# Patient Record
Sex: MALE | Race: WHITE
[De-identification: names, ages, dates, MRNs, and addresses within clinical notes are randomized per-mention and may not be internally consistent; named-entity substitution may affect disease eponyms.]

---

## 2017-07-19 ENCOUNTER — HOSPITAL ENCOUNTER (EMERGENCY)
Dept: HOSPITAL 25 - UCEAST | Age: 67
Discharge: HOME | End: 2017-07-19
Payer: MEDICARE

## 2017-07-19 VITALS — SYSTOLIC BLOOD PRESSURE: 133 MMHG | DIASTOLIC BLOOD PRESSURE: 94 MMHG

## 2017-07-19 DIAGNOSIS — R21: Primary | ICD-10-CM

## 2017-07-19 PROCEDURE — G0463 HOSPITAL OUTPT CLINIC VISIT: HCPCS

## 2017-07-19 PROCEDURE — 99211 OFF/OP EST MAY X REQ PHY/QHP: CPT

## 2017-07-19 NOTE — UC
Skin Complaint HPI





- HPI Summary


HPI Summary: 





This is a 65 yo gentleman with OA of his L hip who presents with c/o rash.  He 

reports that he used a kinesiology tape on his L hip yesterday and was awoken 

in the middle of the night with a painful rash.  He is unsure if the lesion 

that developed is over the exact area that the tape was in place.  He went 

cycling yesterday and today with the tape in place.  He denies a h/o similar 

reaction.  No associated fever or generalized illness.





- History of Current Complaint


Chief Complaint: UCSkin


Stated Complaint: RASH





- Allergy/Home Medications


Allergies/Adverse Reactions: 


 Allergies











Allergy/AdvReac Type Severity Reaction Status Date / Time


 


Apple Allergy  Unknown Verified 07/08/15 10:48





   Reaction  





   Details  














Review of Systems


Constitutional: Negative


Skin: Rash


Eyes: Negative


ENT: Negative


Respiratory: Negative


Cardiovascular: Negative


Gastrointestinal: Negative


Genitourinary: Negative


Motor: Negative


Neurovascular: Negative


Musculoskeletal: Negative


Neurological: Negative


Psychological: Negative


All Other Systems Reviewed And Are Negative: Yes





PMH/Surg Hx/FS Hx/Imm Hx


Previously Healthy: No - L hip OA





- Surgical History


Surgical History: Yes


Surgery Procedure, Year, and Place: RT HIP REPLACEMENT





- Family History


Known Family History: Positive: None





- Social History


Alcohol Use: Weekly


Alcohol Amount: 2 GLASSES WEEKLY


Substance Use Type: None


Smoking Status (MU): Never Smoked Tobacco





Physical Exam


Triage Information Reviewed: Yes


Vital Signs: 


 Initial Vital Signs











Temp  98 F   07/19/17 15:27


 


Pulse  81   07/19/17 15:27


 


Resp  20   07/19/17 15:27


 


BP  133/94   07/19/17 15:27


 


Pulse Ox  99   07/19/17 15:27











Vital Signs Reviewed: Yes


Respiratory: Positive: Chest non-tender, Lungs clear


Cardiovascular: Positive: RRR, No Murmur


Skin: Positive: Other - focal lesion just inferior to the GT, no surrounding 

erythema, superficial layer of skin appears to be sheared off





Course/Dx





- Course


Course Of Treatment: This is a 65 yo gentleman presenting with c/o painful 

rash.  It appears to be a shear injury, perhaps from the tape.  Recommended to 

keep the area covered to prevent infection and inspect daily to look for new 

lesions or signs of infection





- Diagnoses


Provider Diagnoses: 1. Rash





Discharge





- Discharge Plan


Condition: Stable


Disposition: HOME


Patient Education Materials:  Acute Rash (ED)


Referrals: 


Amor Chávez MD [Primary Care Provider] - If Needed


Additional Instructions: 


Activity: As tolerated





Instructions:


1. Keep the area covered to prevent infection, but inspect daily


2. Follow up if there is increased pain, redness or swelling or if new lesions 

develop

## 2017-10-13 NOTE — HP
PREOPERATIVE HISTORY AND PHYSICAL:

 

DATE OF ADMISSION:  10/23/17

 

PROVIDER:  Dr. Erasto Rust * (DICTATED BY MANE BUCKLEY)

 

CHIEF COMPLAINT:  Left hip pain.

 

HISTORY OF PRESENT ILLNESS:  Mr. Deleon is a 66-year-old male, who has had 
ongoing pain in the left hip.  He underwent a right total hip arthroplasty in 
June of 2011 and is doing very well with that.  He is interested in surgical 
intervention for the pain in the left hip at this point.  He takes pain 
medication at night to help him sleep.  He also has increased pain when going 
from sitting to standing position.  He has failed conservative treatment at 
this point.

 

PAST MEDICAL HISTORY:  Significant BPH, bradycardia, obstructive sleep apnea.

 

PAST SURGICAL HISTORY:  Right total hip replacement in 2011, right knee 
arthroscopy and partial meniscectomy in 2016, left carpal tunnel release in 
2015.  He reports no complications with anesthesia with these procedures.

 

CURRENT MEDICATIONS:

1.  Mobic 15 mg 1 p.o. q. day.

2.  Avodart 0.5 mg 1 p.o. q. day.

3.  Minocycline 50 mg 1 p.o. q. day.

4.  Tamsulosin HCl 0.4 mg 1 p.o. q. day.

5.  Viagra 50 mg 1 by mouth q. day as needed.

6.  Ibuprofen 200 mg 2 tabs p.o. b.i.d. as needed.

7.  CPAP for sleeping.

 

ALLERGIES:  No known drug allergies.

 

FAMILY HISTORY:  Noncontributory.

 

SOCIAL HISTORY:  He lives with his wife.  He is a retired.  He does not use 
tobacco.  He drinks 1 to 2 alcoholic beverages per week.  He exercises 
regularly.

 

REVIEW OF SYSTEMS:  Constitutional:  Negative for recent hospitalizations, 
fevers, chills, night sweats, or unexplained weight loss.  Head:  Negative for 
headaches, lightheadedness, or balance problems.  Cardiovascular:  Negative for 
chest or arm pain with exertion, history of heart attack, heart murmur, heart 
palpitations, high blood pressure, embolism, or deep vein thrombosis.  
Respiratory:  Negative for chronic cough, shortness of breath with exertion, 
asthma, or COPD. Gastrointestinal:  Negative for heartburn, nausea, vomiting, 
diarrhea, constipation, or GERD.  Genitourinary:  Positive for nighttime 
urination, urinary frequency, positive for BPH, negative for urinary tract 
infections or kidney problems.  Musculoskeletal:  Negative for chronic back 
pain or recent fractures. Skin:  Negative for rashes, lesions, lumps, or sores.
  Neurologic:  Negative for seizure, stroke, epilepsy, depression or anxiety.  
Endocrine:  Negative for diabetes or thyroid problems.  Hematology:  Negative 
for easy bleeding, bruising, or anemia.

 

                               PHYSICAL EXAMINATION

 

GENERAL:  He is a well-developed, well-nourished male, in no acute distress at 
rest.  He is alert and oriented x3 with appropriate mood and affect.

 

VITAL SIGNS:  The patient is 5 feet 10 inches, 250 pounds, blood pressure 140/84
, pulse 76.

 

HEENT:  Normocephalic, atraumatic.  Hearing and vision are grossly intact.

 

NECK:  His trachea is midline.

 

RESPIRATORY:  Lungs are clear to auscultation bilaterally.  No wheezes, rales, 
or rhonchi.

 

CARDIOVASCULAR:  Regular rate and rhythm.  No murmurs, rubs, or gallops.  
Normal S1 and S2.

 

ABDOMEN:  Soft, nondistended, nontender, normal bowel sounds.

 

EXTREMITIES:  Exam of the _____ hip, skin is intact without abrasions or open 
wounds.  There is no edema or ecchymosis or gross deformities.  He has 
significantly resisted range of motion of the hip and pain with end range of 
motion.  He has good strength in all directions.  He has sensation to light 
touch intact distally.  He has a normal vascular exam.

 

 DIAGNOSTIC STUDIES:  Imaging:  Bilateral hip x-rays showed good right total 
hip arthroplasty and moderate-to-severe osteoarthritis of the left hip.

 

IMPRESSION:  Left hip osteoarthritis.

 

PLAN:  The patient is to undergo left total hip arthroplasty by Dr. Rust on 
10/23/17.  The risks, benefits, and postoperative course were discussed with 
the patient at length and he would like to proceed.  A prescription for 
Percocet was sent to his pharmacy for postoperative pain and Coumadin was also 
sent for DVT prophylaxis.  We will follow up with the patient in the 
postoperative phase.

 

 ____________________________________ MANE BUCKLEY

 

[id number]/CPS #: [id number]

MTDD

## 2017-10-23 ENCOUNTER — HOSPITAL ENCOUNTER (INPATIENT)
Dept: HOSPITAL 25 - AA | Age: 67
LOS: 2 days | Discharge: HOME HEALTH | DRG: 470 | End: 2017-10-25
Attending: ORTHOPAEDIC SURGERY | Admitting: ORTHOPAEDIC SURGERY
Payer: MEDICARE

## 2017-10-23 DIAGNOSIS — M16.12: Primary | ICD-10-CM

## 2017-10-23 DIAGNOSIS — G47.33: ICD-10-CM

## 2017-10-23 DIAGNOSIS — I27.20: ICD-10-CM

## 2017-10-23 DIAGNOSIS — N40.0: ICD-10-CM

## 2017-10-23 DIAGNOSIS — Z79.82: ICD-10-CM

## 2017-10-23 DIAGNOSIS — Z96.641: ICD-10-CM

## 2017-10-23 DIAGNOSIS — I10: ICD-10-CM

## 2017-10-23 DIAGNOSIS — Z72.89: ICD-10-CM

## 2017-10-23 DIAGNOSIS — J45.909: ICD-10-CM

## 2017-10-23 PROCEDURE — 62323 NJX INTERLAMINAR LMBR/SAC: CPT

## 2017-10-23 PROCEDURE — 88304 TISSUE EXAM BY PATHOLOGIST: CPT

## 2017-10-23 PROCEDURE — 0SRB02A REPLACEMENT OF LEFT HIP JOINT WITH METAL ON POLYETHYLENE SYNTHETIC SUBSTITUTE, UNCEMENTED, OPEN APPROACH: ICD-10-PCS | Performed by: ORTHOPAEDIC SURGERY

## 2017-10-23 PROCEDURE — 80048 BASIC METABOLIC PNL TOTAL CA: CPT

## 2017-10-23 PROCEDURE — 85018 HEMOGLOBIN: CPT

## 2017-10-23 PROCEDURE — 72170 X-RAY EXAM OF PELVIS: CPT

## 2017-10-23 PROCEDURE — 85610 PROTHROMBIN TIME: CPT

## 2017-10-23 PROCEDURE — C1713 ANCHOR/SCREW BN/BN,TIS/BN: HCPCS

## 2017-10-23 PROCEDURE — 85014 HEMATOCRIT: CPT

## 2017-10-23 PROCEDURE — 36415 COLL VENOUS BLD VENIPUNCTURE: CPT

## 2017-10-23 PROCEDURE — 88311 DECALCIFY TISSUE: CPT

## 2017-10-23 PROCEDURE — C1776 JOINT DEVICE (IMPLANTABLE): HCPCS

## 2017-10-23 RX ADMIN — DOCUSATE SODIUM SCH MG: 100 CAPSULE, LIQUID FILLED ORAL at 21:30

## 2017-10-23 RX ADMIN — TAMSULOSIN HYDROCHLORIDE SCH MG: 0.4 CAPSULE ORAL at 21:31

## 2017-10-23 RX ADMIN — FERROUS SULFATE TAB 325 MG (65 MG ELEMENTAL FE) SCH MG: 325 (65 FE) TAB at 21:30

## 2017-10-23 RX ADMIN — MAGNESIUM HYDROXIDE SCH: 400 SUSPENSION ORAL at 21:31

## 2017-10-23 RX ADMIN — CEFAZOLIN SCH MLS/HR: 330 INJECTION, POWDER, FOR SOLUTION INTRAMUSCULAR; INTRAVENOUS at 17:43

## 2017-10-23 RX ADMIN — DEXTROSE MONOHYDRATE AND SODIUM CHLORIDE SCH MLS/HR: 5; .45 INJECTION, SOLUTION INTRAVENOUS at 15:30

## 2017-10-23 NOTE — RAD
Indication: Left total hip arthroplasty.



Single low AP view of the pelvis demonstrates bilateral hip replacements. No fracture is

noted. No other bone or joint abnormality is identified.



IMPRESSION: No fracture of the pelvis is noted.

## 2017-10-24 LAB
ANION GAP SERPL CALC-SCNC: 3 MMOL/L (ref 2–11)
BUN SERPL-MCNC: 16 MG/DL (ref 6–24)
BUN/CREAT SERPL: 18 (ref 8–20)
CALCIUM SERPL-MCNC: 8.6 MG/DL (ref 8.6–10.3)
CHLORIDE SERPL-SCNC: 103 MMOL/L (ref 101–111)
GLUCOSE SERPL-MCNC: 147 MG/DL (ref 70–100)
HCO3 SERPL-SCNC: 29 MMOL/L (ref 22–32)
HCT VFR BLD AUTO: 29 % (ref 42–52)
HGB BLD-MCNC: 9.7 G/DL (ref 14–18)
POTASSIUM SERPL-SCNC: 4 MMOL/L (ref 3.5–5)
SODIUM SERPL-SCNC: 135 MMOL/L (ref 133–145)

## 2017-10-24 RX ADMIN — FERROUS SULFATE TAB 325 MG (65 MG ELEMENTAL FE) SCH MG: 325 (65 FE) TAB at 09:03

## 2017-10-24 RX ADMIN — OXYCODONE HYDROCHLORIDE AND ACETAMINOPHEN PRN TAB: 5; 325 TABLET ORAL at 03:50

## 2017-10-24 RX ADMIN — HEPARIN SODIUM SCH UNITS: 5000 INJECTION INTRAVENOUS; SUBCUTANEOUS at 12:44

## 2017-10-24 RX ADMIN — FERROUS SULFATE TAB 325 MG (65 MG ELEMENTAL FE) SCH MG: 325 (65 FE) TAB at 20:04

## 2017-10-24 RX ADMIN — MINOCYCLINE HYDROCHLORIDE SCH MG: 50 CAPSULE ORAL at 09:03

## 2017-10-24 RX ADMIN — CEFAZOLIN SCH MLS/HR: 330 INJECTION, POWDER, FOR SOLUTION INTRAMUSCULAR; INTRAVENOUS at 01:39

## 2017-10-24 RX ADMIN — DEXTROSE MONOHYDRATE AND SODIUM CHLORIDE SCH MLS/HR: 5; .45 INJECTION, SOLUTION INTRAVENOUS at 01:38

## 2017-10-24 RX ADMIN — MAGNESIUM HYDROXIDE SCH ML: 400 SUSPENSION ORAL at 20:04

## 2017-10-24 RX ADMIN — CEFAZOLIN SCH MLS/HR: 330 INJECTION, POWDER, FOR SOLUTION INTRAMUSCULAR; INTRAVENOUS at 10:29

## 2017-10-24 RX ADMIN — MAGNESIUM HYDROXIDE SCH ML: 400 SUSPENSION ORAL at 09:03

## 2017-10-24 RX ADMIN — DOCUSATE SODIUM SCH MG: 100 CAPSULE, LIQUID FILLED ORAL at 09:04

## 2017-10-24 RX ADMIN — TAMSULOSIN HYDROCHLORIDE SCH MG: 0.4 CAPSULE ORAL at 20:04

## 2017-10-24 RX ADMIN — FINASTERIDE SCH MG: 5 TABLET, FILM COATED ORAL at 09:03

## 2017-10-24 RX ADMIN — OXYCODONE HYDROCHLORIDE AND ACETAMINOPHEN PRN TAB: 5; 325 TABLET ORAL at 19:45

## 2017-10-24 RX ADMIN — HEPARIN SODIUM SCH UNITS: 5000 INJECTION INTRAVENOUS; SUBCUTANEOUS at 19:45

## 2017-10-24 RX ADMIN — THERA TABS SCH TAB: TAB at 09:04

## 2017-10-24 RX ADMIN — TAMSULOSIN HYDROCHLORIDE SCH MG: 0.4 CAPSULE ORAL at 09:03

## 2017-10-24 RX ADMIN — ASPIRIN 325 MG ORAL TABLET SCH MG: 325 PILL ORAL at 18:20

## 2017-10-24 RX ADMIN — DOCUSATE SODIUM SCH MG: 100 CAPSULE, LIQUID FILLED ORAL at 20:04

## 2017-10-24 NOTE — PN
Progress Note





- Progress Note


Date of Service: 10/24/17


SOAP: 


Subjective:


Pt comfortably lying in bed. No complaint of pain. Denies CP, SOB, numbness or 

tingling.








Objective:


Dressing was saturated and changed. Incision C/D/I. Calves soft, nontender. 2+ 

DP pulses. Sensation intact to light touch.





Vital Signs:











Temp Pulse Resp BP Pulse Ox


 


 97.8 F   59   20   110/59   98 


 


 10/24/17 07:44  10/24/17 07:44  10/24/17 08:00  10/24/17 07:44  10/24/17 08:00








 Laboratory Last Values











Hgb  9.7 g/dl (14.0-18.0)  L  10/24/17  04:47    


 


Hct  29 % (42-52)  L  10/24/17  04:47    


 


INR (Anticoag Therapy)  1.10  (0.89-1.11)   10/24/17  04:47    


 


Sodium  135 mmol/L (133-145)   10/24/17  04:47    


 


Potassium  4.0 mmol/L (3.5-5.0)   10/24/17  04:47    


 


Chloride  103 mmol/L (101-111)   10/24/17  04:47    


 


Carbon Dioxide  29 mmol/L (22-32)   10/24/17  04:47    


 


Anion Gap  3 mmol/L (2-11)   10/24/17  04:47    


 


BUN  16 mg/dL (6-24)   10/24/17  04:47    


 


Creatinine  0.89 mg/dL (0.67-1.17)   10/24/17  04:47    


 


Est GFR ( Amer)  110.0  (>60)   10/24/17  04:47    


 


Est GFR (Non-Af Amer)  85.5  (>60)   10/24/17  04:47    


 


BUN/Creatinine Ratio  18.0  (8-20)   10/24/17  04:47    


 


Glucose  147 mg/dL ()  H  10/24/17  04:47    


 


Calcium  8.6 mg/dL (8.6-10.3)   10/24/17  04:47    











Assessment:


65 yo male s/p Left DUANE POD #1








Plan:


OOB PT/OT


WBAT LLE


Pain Control


DVT prophylaxis - Coumdin 8 mg tonight

## 2017-10-24 NOTE — OP
OPERATIVE REPORT:

 

DATE OF OPERATION:  10/23/17 - Inpatient, room [facility]

 

DATE OF BIRTH:  [birth year]

 

ATTENDING SURGEON:  Erasto Rust MD

 

ASSISTANT:  MANE Curry



ANESTHESIOLOGIST:  Paul Camilo MD

 

ANESTHESIA:  Spinal sedation.

 

PRE-OP DIAGNOSIS:  Osteoarthritis, left hip.

 

POST-OP DIAGNOSIS:  Osteoarthritis, left hip.

 

OPERATIVE PROCEDURE:  Left total hip arthroplasty.

 

ESTIMATED BLOOD LOSS:  250 cc.

 

COMPLICATIONS:  None.

 

HARDWARE:  Jc Continuum cup 54 mm with 2 screws, 15-degree elevated liner, 
13.5 mL taper, +0 36 mm head.

 

SUMMARY:  Mr. Deleon is a 66-year-old male who has been having more troubles now 
with his left hip.  He had undergone a right total hip arthroplasty in 2011 and 
had done quite well with that.  His left hip had only been a little bit 
bothersome at that point, but had slowly gotten worse over the years.  He 
reports this is a point where he needs to have his left hip replaced.  X-ray 
was completely bone-on-bone with significant osteoarthritis and bony changes.  
I discussed with him that a total hip arthroplasty should work well to decrease 
his pain and improve his function.  Risks of surgery such as infection, scar 
formation, stiffness, DVT, pulmonary embolism, hardware failure, instability, 
and leg length discrepancy were some of the risks discussed.  He had been 
declared medically optimized and wished to proceed.

 

DESCRIPTION OF PROCEDURE:  The patient was brought to the OR and spinal 
anesthesia was introduced.  Christensen catheter was placed and that went smoothly.  
He was then rolled into the right lateral decubitus position and an axillary 
role was placed. The left hip area was prepped and then draped.  Incision was 
made centered about the greater trochanter extending proximally and distally 
for about 8 cm.  Incision was carried down through the skin and subcutaneous fat
, fascia was exposed and sharply incised.  Ellie Strong was present from 
positioning to draping to the approach, placement of the hardware as well as 
closure and the case could not have been done without an assistant.  We had 
quite a bit of muscle under the fascia and electrocautery was used to ligate 
some of the small bleeders that were present.  He also oozed quite a bit.  With 
blunt dissection, I was able to come under the gluteus medius, gluteus minimus 
and with coming upwards, nice exposure of the piriformis and short external 
rotators were obtained.  Taking them down using electrocautery, I was able to 
come to neck and nicely exposed the neck of the femur.  With trying to 
dislocate him; however, he did not come out easily.  I had to take a little 
more capsule over the top superiorly and then a little bit more inferiorly in 
order that I could release him and then I had troubles tearing his ligamentum 
teres.  Eventually though, hip was dislocated and cutting guide was placed.  
Femoral neck was marked and a nice femoral neck cut was taken.  Femur was then 
shifted anteriorly and good exposure of the acetabulum was obtained.  Kocher 
was used to hang out to some of the frayed labrum, which was present and labrum 
was sharply excised.  He had much more of an osteophyte present, then what the x
-ray showed, but initially I had thought that this would be alright, but 
eventually I did take this down using osteotome and rongeur.  Beginning with a 
48 reamer, he was first deepened a little bit and then progressively expanded.  
He had a 54 cup on the opposite side and a 53 had a nice side-to-side bite and 
a 54 cup was then impacted into place.  Nice bite was obtained.  Two screws 
were placed and good bite was obtained with each of the screws as well.  Trial 
liner was placed and attention was turned to the femur.  Box osteotome was used 
to open the femoral canal and the canal finder was easily passed.  Five broach 
was first used to lateralize a little bit and then progressively broached the 
femur.  With the 11, I felt I had a little bit more resistance, but even the 
12.5 sat down quite nicely.  He had a 12.5 on the right side.  A 13.5 had an 
even more solid fit and I stayed at the 13.5.  He was trailed with a standard 
head and neck and his leg length appeared to be equal.  He had good stability, 
but with internal rotation, he started to lever out at about 30 to 45 degrees.  
Trial instrumentation was removed and elevated.  Posterior liner was placed.  
13.5 stem was also impacted into place and he was again trialed with a 0 head.  
With the elevator liner, he had much better stability and only would lever out 
at about  60 to 75 degrees of internal rotation.  Considering his leg length 
appeared to be equal, +0 36 mm head was then impacted into place.  The hip was 
copiously pulse lavaged.  Capsule and short external rotators were repaired 
together to the posterior aspect of the greater trochanter.  Hip was again 
copiously pulse lavaged.  Fascia was repaired using interrupted #1 Vicryl 
sutures and the subcutaneous sutures were also pulse lavaged.  Subcutaneous 
tissue were approximated with 2-0 Vicryl, skin was closed using staples.  
Sterile dressing and an abduction pillow were applied in the OR.  The patient 
was then rolled on to the hospital bed and was stable on transfer to the 
recovery room.

 

 [id number]/CPS #: [id number]

MTDD

## 2017-10-25 VITALS — DIASTOLIC BLOOD PRESSURE: 72 MMHG | SYSTOLIC BLOOD PRESSURE: 131 MMHG

## 2017-10-25 LAB
HCT VFR BLD AUTO: 27 % (ref 42–52)
HGB BLD-MCNC: 9.1 G/DL (ref 14–18)

## 2017-10-25 RX ADMIN — FINASTERIDE SCH MG: 5 TABLET, FILM COATED ORAL at 08:27

## 2017-10-25 RX ADMIN — HEPARIN SODIUM SCH UNITS: 5000 INJECTION INTRAVENOUS; SUBCUTANEOUS at 12:22

## 2017-10-25 RX ADMIN — ASPIRIN 325 MG ORAL TABLET SCH MG: 325 PILL ORAL at 08:26

## 2017-10-25 RX ADMIN — OXYCODONE HYDROCHLORIDE AND ACETAMINOPHEN PRN TAB: 5; 325 TABLET ORAL at 08:25

## 2017-10-25 RX ADMIN — THERA TABS SCH TAB: TAB at 08:28

## 2017-10-25 RX ADMIN — TAMSULOSIN HYDROCHLORIDE SCH MG: 0.4 CAPSULE ORAL at 08:28

## 2017-10-25 RX ADMIN — DOCUSATE SODIUM SCH MG: 100 CAPSULE, LIQUID FILLED ORAL at 08:26

## 2017-10-25 RX ADMIN — OXYCODONE HYDROCHLORIDE AND ACETAMINOPHEN PRN TAB: 5; 325 TABLET ORAL at 00:19

## 2017-10-25 RX ADMIN — MINOCYCLINE HYDROCHLORIDE SCH MG: 50 CAPSULE ORAL at 08:28

## 2017-10-25 RX ADMIN — MAGNESIUM HYDROXIDE SCH ML: 400 SUSPENSION ORAL at 08:25

## 2017-10-25 RX ADMIN — FERROUS SULFATE TAB 325 MG (65 MG ELEMENTAL FE) SCH MG: 325 (65 FE) TAB at 08:28

## 2017-10-25 RX ADMIN — HEPARIN SODIUM SCH UNITS: 5000 INJECTION INTRAVENOUS; SUBCUTANEOUS at 03:40

## 2017-10-25 NOTE — PN
Progress Note





- Progress Note


Date of Service: 10/25/17


SOAP: 


Subjective:


[Pt reports good night, would like to go home today]








Objective:


[Pt sitting in chair, wasable to stand for his dressing change.





VSS: afebrile





Labs: H/H: 9.1/27, INR: 1.7]





Left hip: Wound benign, no drainage, no erythema, but good amount of ecchymosis 

extending from superior to the wound to inferior.  Also swollen in this area.








Assessment:


[Stable]








Plan:


[Continue OOB/PT, DVT prophalaxis, care





He has expressed the desire to avoid coumadin and I had told him if he moved 

well, we could use aspirin instead.  He did great with PT and is moving well, 

so I started him on ASA yesterday.





Likely d/c later today.]

## 2017-10-26 NOTE — DS
Amended report to enter cosigning physician on report.



DISCHARGE SUMMARY:

 

DATE OF ADMISSION:  10/23/17

 

DATE OF DISCHARGE:  10/25/17

 

ATTENDING PHYSICIAN:  Erasto Rust MD* (dictated by MANE Vazquez).

 

ADMISSION DIAGNOSIS:  Osteoarthritis, left hip.

 

DISCHARGE DIAGNOSIS:  Osteoarthritis, left hip.

 

SURGERY PERFORMED:  Left total hip arthroplasty.

 

HOSPITAL COURSE:  The patient is a 66-year-old male, who underwent right total 
hip arthroplasty in 2011 and did very well.  He has had increased pain in the 
left hip and worsened over the last several months and decided to proceed with 
total hip arthroplasty.  He failed conservative management.  His x-rays 
revealed bone-on-bone arthritis.  He was taken to the operating room under the 
care of Dr. Erasto Rust on the date of 10/23/17 for the aforementioned 
procedure.  He tolerated the procedure well and left the operating room in 
stable condition.  Postoperatively, he progressed very well with the physical 
therapy and occupational therapy goals, bearing weight as tolerated on the left 
lower extremity.  He was stable medically and orthopedically for discharge to 
home on the date of 10/25/17.

 

CONDITION ON DISCHARGE:  Temperature 98, pulse 79, respiratory rate 17, O2 sat 
is 96% on room air, blood pressure 124/68.  His hemoglobin is 9.1, hematocrit 
27.  His wound is healing without evidence of infection.  His calf is soft and 
nontender. His neurovascular status is intact distally.

 

PLAN:  Discharged to home bearing weight as tolerated on the left lower 
extremity. He will use aspirin 325 mg p.o. daily for DVT prophylaxis.  He may 
shower on 10/26/17 with a light dressing to cover the incision as needed.  He 
will follow up in the office as scheduled with Dr. Rust in mid November.  
Home nursing services will remove staples at about 10 to 14 days.  If there are 
any concerns with increased pain, noted redness or drainage from his incision, 
calf pain or swelling, fever or chills, the office will be contacted prior to a 
scheduled appointment in November with Dr. Rust.

 

____________________________________ 

MANE VAZQUEZ

 

[id number]/CPS #: [id number]

MTDD

## 2017-11-19 ENCOUNTER — HOSPITAL ENCOUNTER (EMERGENCY)
Dept: HOSPITAL 25 - ED | Age: 67
Discharge: HOME | End: 2017-11-19
Payer: MEDICARE

## 2017-11-19 VITALS — SYSTOLIC BLOOD PRESSURE: 122 MMHG | DIASTOLIC BLOOD PRESSURE: 76 MMHG

## 2017-11-19 DIAGNOSIS — R33.9: Primary | ICD-10-CM

## 2017-11-19 DIAGNOSIS — K59.00: ICD-10-CM

## 2017-11-19 DIAGNOSIS — M62.830: ICD-10-CM

## 2017-11-19 PROCEDURE — 99282 EMERGENCY DEPT VISIT SF MDM: CPT

## 2017-11-19 NOTE — ED
Charissa RAMOS Alfonso, scribed for Nate Villegas MD on 11/19/17 at 0203 .





GI/ HPI





- HPI Summary


HPI Summary: 


 This patient is a 66 year old M presenting to [facility] accompanied by female with 

a chief complaint of constipation and urinary retention since 2300 yesterday. 

The patient rates the pain 5/10 in severity. Symptoms aggravated and alleviated 

by nothing. Patient reports back spasms. Patient denies dysuria. He is taking 

stool softeners and drinking prune juice.





- History of Current Complaint


Chief Complaint: EDUrogenitalProblems


Stated Complaint: CONSTIPATION/DIFF URINATING


Hx Obtained From: Patient


Onset/Duration: Started Hours Ago, Still Present


Timing: Constant


Pain Intensity: 5 - /10


Associated Signs and Symptoms: Positive: Other: - back spasms, constipation, 

and urinary retention.  Negative: Dysuria


Aggravating Factor(s): Nothing


Alleviating Factor(s): Nothing





- Additional Pertinent History


Primary Care Physician: [id number]





- Allergy/Home Medications


Allergies/Adverse Reactions: 


 Allergies











Allergy/AdvReac Type Severity Reaction Status Date / Time


 


Apple Allergy Severe severe Verified 11/01/17 05:29





   diarrhea  














PMH/Surg Hx/FS Hx/Imm Hx


Endocrine/Hematology History: 


   Denies: Hx Diabetes


Cardiovascular History: 


   Denies: Hx Congestive Heart Failure, Hx Hypertension, Hx Pacemaker/ICD


Respiratory History: Reports: Hx Sleep Apnea


   Denies: Hx Asthma


GI History: Reports: Other GI Disorders - hemorrhoids


 History: Reports: Other  Problems/Disorders - BPH


   Denies: Hx Renal Disease


Musculoskeletal History: Reports: Hx Arthritis - HIPS, Other Musculoskeletal 

History - right knee


Sensory History: Reports: Hx Contacts or Glasses - GLASSES


   Denies: Hx Hearing Aid


Opthamlomology History: Reports: Hx Contacts or Glasses - GLASSES


Psychiatric History: 


   Denies: Hx Panic Disorder





- Surgical History


Surgery Procedure, Year, and Place: RT HIP REPLACEMENT 2011.  Left wrist carpal 

tunnel 2015.  right knee meniscus repair 2014


Hx Anesthesia Reactions: No


Infectious Disease History: No


Infectious Disease History: 


   Denies: Hx Clostridium Difficile, Hx Hepatitis, Hx Human Immunodeficiency 

Virus (HIV), Hx of Known/Suspected MRSA, Hx Shingles, Hx Tuberculosis, Hx Known/

Suspected VRE, Hx Known/Suspected VRSA, History Other Infectious Disease, 

Traveled Outside the [place] in Last 30 Days





- Family History


Known Family History: Positive: Hypertension, Diabetes





- Social History


Alcohol Use: Weekly


Alcohol Amount: 2 GLASSES WEEKLY


Hx Substance Use: No


Substance Use Type: Reports: None


Hx Tobacco Use: No


Smoking Status (MU): Never Smoked Tobacco





Review of Systems


Negative: Fever


Positive: Other - Constipation


Positive: dysuria, other - retention


Positive: Other - Back spasms


All Other Systems Reviewed And Are Negative: Yes





Physical Exam





- Summary


Physical Exam Summary: 





Appearance: Well-appearing, Well-nourished


Skin: Warm


Eyes: Normal


ENT: Normal 


Neck: Supple, nontender


Respiratory: Clear to auscultation


Cardiovascular: Normal


Abdomen: Soft, nontender


: Urinary catheter in place draining clear yellow urine.


Bowel: Present


Musculoskeletal: Strength/ROM Intact


Neurological: Normal, A&Ox3


Psychiatric: Normal





Triage Information Reviewed: Yes


Vital Signs On Initial Exam: 


 Initial Vitals











Temp Pulse Resp BP Pulse Ox


 


 96.8 F   73   14   151/89   98 


 


 11/19/17 00:32  11/19/17 00:32  11/19/17 00:32  11/19/17 00:32  11/19/17 00:32











Vital Signs Reviewed: Yes





Diagnostics





- Vital Signs


 Vital Signs











  Temp Pulse Resp BP Pulse Ox


 


 11/19/17 00:32  96.8 F  73  14  151/89  98














- Laboratory


Lab Statement: Any lab studies that have been ordered have been reviewed, and 

results considered in the medical decision making process.





GIGU Course/Dx





- Course


Assessment/Plan: much improved after carrasco placement, clear copious urine, 

immedaite relief, pt instructed to fu with urologist for removal and return to 

ed if hes not able to see one. agrees to and understnads dc instructions.





- Diagnoses


Provider Diagnoses: 


 Urinary retention








Discharge





- Discharge Plan


Condition: Improved


Disposition: HOME


Prescriptions: 


Magnesium CITRATE* [Citrate of Magnesia*] 300 ml PO ONCE #1 btl


Patient Education Materials:  Constipation (ED), Carrasco Catheter Placement and 

Care (ED)


Referrals: 


Amor Chávez MD [Primary Care Provider] - 


Jorgito Grayson MD [Medical Doctor] - 


Additional Instructions: 


1. PLEASE MAKE AN APPOINTMENT FIRST THING IN THE MORNING TO BE SEEN BY YOUR 

UROLOGIST WITHIN 1-3 DAYS





2. PLEASE RETURN IMMEDIATELY TO THE ER IF YOU HAVE ANY WORSENING OR CONCERNING 

SYMPTOMS OR IF YOU CANNOT GET IN TOUCH WITH YOUR UROLOGIST





3. PLEASE DO NOT TRY TO REMOVE CARRASCO ON YOUR OWN





The documentation as recorded by the scribCharissa meraz Alfonso accurately reflects 

the service I personally performed and the decisions made by me, Nate Villegas MD.

## 2018-06-25 ENCOUNTER — HOSPITAL ENCOUNTER (EMERGENCY)
Dept: HOSPITAL 25 - UCEAST | Age: 68
Discharge: HOME | End: 2018-06-25
Payer: MEDICARE

## 2018-06-25 VITALS — DIASTOLIC BLOOD PRESSURE: 83 MMHG | SYSTOLIC BLOOD PRESSURE: 133 MMHG

## 2018-06-25 DIAGNOSIS — Z96.643: ICD-10-CM

## 2018-06-25 DIAGNOSIS — J02.9: Primary | ICD-10-CM

## 2018-06-25 DIAGNOSIS — Z91.018: ICD-10-CM

## 2018-06-25 PROCEDURE — 99212 OFFICE O/P EST SF 10 MIN: CPT

## 2018-06-25 PROCEDURE — 87651 STREP A DNA AMP PROBE: CPT

## 2018-06-25 PROCEDURE — G0463 HOSPITAL OUTPT CLINIC VISIT: HCPCS

## 2018-06-25 NOTE — UC
Throat Pain/Nasal Joseph HPI





- HPI Summary


HPI Summary: 


Sore throat for one day, some cough no fever, believes he may have been exposed 

to strep last week








- History of Current Complaint


Chief Complaint: UCGeneralIllness


Stated Complaint: THROAT COMPLAINT


Time Seen by Provider: 06/25/18 11:12


Hx Obtained From: Patient


Onset/Duration: Sudden Onset


Pain Intensity: 3


Pain Scale Used: 0-10 Numeric


Cough: Nonproductive


Associated Signs & Symptoms: Positive: Negative





- Allergies/Home Medications


Allergies/Adverse Reactions: 


 Allergies











Allergy/AdvReac Type Severity Reaction Status Date / Time


 


apple Allergy  Diarrhea Verified 06/25/18 10:59











Home Medications: 


 Home Medications





Aspirin 81 mg CHEW TAB* 81 mg PO DAILY 06/25/18 [History Confirmed 06/25/18]


Ibuprofen TAB* [Motrin TAB* 400 MG] 400 mg PO Q6H PRN 06/25/18 [History 

Confirmed 06/25/18]











PMH/Surg Hx/FS Hx/Imm Hx


GI/ History: Other


Other GI/ History: BPH





- Surgical History


Surgical History: Yes


Surgery Procedure, Year, and Place: RT HIP REPLACEMENT 2011.  Left wrist carpal 

tunnel 2015.  right knee meniscus repair 2014.  L hip replacement 2017





- Family History


Known Family History: Positive: None, Hypertension, Diabetes





- Social History


Occupation: Works From/At Home


Lives: With Family


Alcohol Use: Weekly


Alcohol Amount: 3 beers WEEKLY


Substance Use Type: None


Smoking Status (MU): Never Smoked Tobacco





- Immunization History


Most Recent Influenza Vaccination: 2016


Most Recent Pneumonia Vaccination: 2017





Review of Systems


Constitutional: Negative


Skin: Negative


Eyes: Negative


ENT: Sore Throat, Nasal Discharge


Respiratory: Cough


Cardiovascular: Negative


Gastrointestinal: Negative


Genitourinary: Negative


Motor: Negative


Neurovascular: Negative


Musculoskeletal: Negative


Neurological: Negative


Psychological: Negative


Is Patient Immunocompromised?: No


All Other Systems Reviewed And Are Negative: Yes





Physical Exam


Triage Information Reviewed: Yes


Appearance: Well-Appearing, No Pain Distress, Well-Nourished


Vital Signs: 


 Initial Vital Signs











Temp  97.8 F   06/25/18 11:03


 


Pulse  55   06/25/18 11:03


 


Resp  16   06/25/18 11:03


 


BP  133/83   06/25/18 11:03


 


Pulse Ox  99   06/25/18 11:03











Vital Signs Reviewed: Yes


Eye Exam: Normal


Eyes: Positive: Conjunctiva Clear


ENT Exam: Normal


ENT: Positive: Normal ENT inspection, Hearing grossly normal, Pharynx normal, 

Uvula midline.  Negative: Nasal congestion, Nasal drainage, Tonsillar exudate, 

Trismus, Muffled voice, Hoarse voice


Dental Exam: Normal


Neck exam: Normal


Neck: Positive: Supple, Nontender, Enlarged Nodes @ - anterior cervical mildly 

enlarged and tender


Respiratory Exam: Normal


Respiratory: Positive: Chest non-tender, Lungs clear, Normal breath sounds, No 

respiratory distress, No accessory muscle use


Cardiovascular Exam: Normal


Cardiovascular: Positive: RRR, No Murmur, Pulses Normal, Brisk Capillary Refill


Musculoskeletal Exam: Normal


Musculoskeletal: Positive: Strength Intact, ROM Intact, No Edema


Neurological Exam: Normal


Neurological: Positive: Alert, Muscle Tone Normal


Psychological Exam: Normal


Skin Exam: Normal





Diagnostics





- Laboratory


Diagnostic Studies Completed/Ordered: RST (-)





Throat Pain/Nasal Course/Dx





- Course


Assessment/Plan: tylenol, ibuprofen, increase fluids, throat sprays Lisha. follow 

with pcp prn





- Differential Dx/Diagnosis


Provider Diagnoses: pharyngitis





Discharge





- Sign-Out/Discharge


Documenting (check all that apply): Discharge/Admit/Transfer





- Discharge Plan


Condition: Stable


Disposition: HOME


Patient Education Materials:  Benzocaine/Menthol (By mouth), Pharyngitis (ED)


Referrals: 


Amor Chávez MD [Primary Care Provider] - If Needed





- Billing Disposition and Condition


Condition: STABLE


Disposition: Home